# Patient Record
Sex: FEMALE
[De-identification: names, ages, dates, MRNs, and addresses within clinical notes are randomized per-mention and may not be internally consistent; named-entity substitution may affect disease eponyms.]

---

## 2018-03-24 ENCOUNTER — HOSPITAL ENCOUNTER (INPATIENT)
Dept: HOSPITAL 90 - EDH | Age: 43
LOS: 3 days | Discharge: HOME | DRG: 69 | End: 2018-03-27
Attending: FAMILY MEDICINE | Admitting: FAMILY MEDICINE
Payer: SELF-PAY

## 2018-03-24 VITALS — DIASTOLIC BLOOD PRESSURE: 67 MMHG | SYSTOLIC BLOOD PRESSURE: 102 MMHG

## 2018-03-24 VITALS — WEIGHT: 213.2 LBS | BODY MASS INDEX: 34.27 KG/M2 | HEIGHT: 66 IN

## 2018-03-24 VITALS — SYSTOLIC BLOOD PRESSURE: 102 MMHG | DIASTOLIC BLOOD PRESSURE: 74 MMHG

## 2018-03-24 DIAGNOSIS — G45.9: Primary | ICD-10-CM

## 2018-03-24 DIAGNOSIS — E66.9: ICD-10-CM

## 2018-03-24 DIAGNOSIS — E03.9: ICD-10-CM

## 2018-03-24 DIAGNOSIS — E78.5: ICD-10-CM

## 2018-03-24 DIAGNOSIS — Z98.51: ICD-10-CM

## 2018-03-24 DIAGNOSIS — I10: ICD-10-CM

## 2018-03-24 DIAGNOSIS — D32.9: ICD-10-CM

## 2018-03-24 LAB
ALBUMIN SERPL-MCNC: 3.8 G/DL (ref 3.5–5)
ALT SERPL-CCNC: 17 U/L (ref 12–78)
AST SERPL-CCNC: 15 U/L (ref 10–37)
BASOPHILS NFR BLD AUTO: 0.7 % (ref 0–5)
BILIRUB SERPL-MCNC: 0.4 MG/DL (ref 0.2–1)
BUN SERPL-MCNC: 9 MG/DL (ref 7–18)
CHLORIDE SERPL-SCNC: 106 MMOL/L (ref 101–111)
CO2 SERPL-SCNC: 28 MMOL/L (ref 21–32)
CREAT SERPL-MCNC: 0.6 MG/DL (ref 0.5–1.5)
EOSINOPHIL NFR BLD AUTO: 1.4 % (ref 0–8)
ERYTHROCYTE [DISTWIDTH] IN BLOOD BY AUTOMATED COUNT: 14.1 % (ref 11–15.5)
GFR SERPL CREATININE-BSD FRML MDRD: 115 ML/MIN (ref 60–?)
GLUCOSE SERPL-MCNC: 96 MG/DL (ref 70–105)
HCT VFR BLD AUTO: 38.7 % (ref 36–48)
LYMPHOCYTES NFR SPEC AUTO: 32.9 % (ref 21–51)
MCH RBC QN AUTO: 29.3 PG (ref 27–33)
MCHC RBC AUTO-ENTMCNC: 35.9 G/DL (ref 32–36)
MCV RBC AUTO: 81.6 FL (ref 79–99)
MONOCYTES NFR BLD AUTO: 7.6 % (ref 3–13)
NEUTROPHILS NFR BLD AUTO: 57.4 % (ref 40–77)
NRBC BLD MANUAL-RTO: 0 % (ref 0–0.19)
PLATELET # BLD AUTO: 235 K/UL (ref 130–400)
POTASSIUM SERPL-SCNC: 4.1 MMOL/L (ref 3.5–5.1)
PROT SERPL-MCNC: 7.9 G/DL (ref 6–8.3)
RBC # BLD AUTO: 4.74 MIL/UL (ref 4–5.5)
SODIUM SERPL-SCNC: 141 MMOL/L (ref 136–145)
WBC # BLD AUTO: 9.6 K/UL (ref 4.8–10.8)

## 2018-03-24 PROCEDURE — 93306 TTE W/DOPPLER COMPLETE: CPT

## 2018-03-24 PROCEDURE — 93880 EXTRACRANIAL BILAT STUDY: CPT

## 2018-03-24 PROCEDURE — 70553 MRI BRAIN STEM W/O & W/DYE: CPT

## 2018-03-24 PROCEDURE — 83036 HEMOGLOBIN GLYCOSYLATED A1C: CPT

## 2018-03-24 PROCEDURE — 83735 ASSAY OF MAGNESIUM: CPT

## 2018-03-24 PROCEDURE — 85025 COMPLETE CBC W/AUTO DIFF WBC: CPT

## 2018-03-24 PROCEDURE — 36415 COLL VENOUS BLD VENIPUNCTURE: CPT

## 2018-03-24 PROCEDURE — 70450 CT HEAD/BRAIN W/O DYE: CPT

## 2018-03-24 PROCEDURE — 80048 BASIC METABOLIC PNL TOTAL CA: CPT

## 2018-03-24 PROCEDURE — 84443 ASSAY THYROID STIM HORMONE: CPT

## 2018-03-24 PROCEDURE — 80053 COMPREHEN METABOLIC PANEL: CPT

## 2018-03-24 PROCEDURE — 82948 REAGENT STRIP/BLOOD GLUCOSE: CPT

## 2018-03-24 PROCEDURE — 80061 LIPID PANEL: CPT

## 2018-03-24 PROCEDURE — 93005 ELECTROCARDIOGRAM TRACING: CPT

## 2018-03-24 PROCEDURE — 93970 EXTREMITY STUDY: CPT

## 2018-03-25 VITALS — DIASTOLIC BLOOD PRESSURE: 76 MMHG | SYSTOLIC BLOOD PRESSURE: 110 MMHG

## 2018-03-25 VITALS — DIASTOLIC BLOOD PRESSURE: 63 MMHG | SYSTOLIC BLOOD PRESSURE: 106 MMHG

## 2018-03-25 VITALS — DIASTOLIC BLOOD PRESSURE: 63 MMHG | SYSTOLIC BLOOD PRESSURE: 109 MMHG

## 2018-03-25 VITALS — DIASTOLIC BLOOD PRESSURE: 74 MMHG | SYSTOLIC BLOOD PRESSURE: 122 MMHG

## 2018-03-25 VITALS — DIASTOLIC BLOOD PRESSURE: 85 MMHG | SYSTOLIC BLOOD PRESSURE: 105 MMHG

## 2018-03-25 VITALS — SYSTOLIC BLOOD PRESSURE: 100 MMHG | DIASTOLIC BLOOD PRESSURE: 59 MMHG

## 2018-03-25 LAB
BASOPHILS NFR BLD AUTO: 0.4 % (ref 0–5)
BUN SERPL-MCNC: 8 MG/DL (ref 7–18)
CHLORIDE SERPL-SCNC: 108 MMOL/L (ref 101–111)
CHOLEST SERPL-MCNC: 220 MG/DL (ref ?–200)
CO2 SERPL-SCNC: 26 MMOL/L (ref 21–32)
CREAT SERPL-MCNC: 0.6 MG/DL (ref 0.5–1.5)
EOSINOPHIL NFR BLD AUTO: 2 % (ref 0–8)
ERYTHROCYTE [DISTWIDTH] IN BLOOD BY AUTOMATED COUNT: 14.3 % (ref 11–15.5)
GFR SERPL CREATININE-BSD FRML MDRD: 115 ML/MIN (ref 60–?)
GLUCOSE SERPL-MCNC: 102 MG/DL (ref 70–105)
HBA1C MFR BLD: 5.3 % (ref 4–6)
HCT VFR BLD AUTO: 35.7 % (ref 36–48)
HDLC SERPL-MCNC: 38 MG/DL (ref 35–85)
LDLC SERPL CALC-MCNC: 168 MG/DL (ref 0–99)
LYMPHOCYTES NFR SPEC AUTO: 42 % (ref 21–51)
MCH RBC QN AUTO: 28.3 PG (ref 27–33)
MCHC RBC AUTO-ENTMCNC: 34.7 G/DL (ref 32–36)
MCV RBC AUTO: 81.5 FL (ref 79–99)
MONOCYTES NFR BLD AUTO: 7.2 % (ref 3–13)
NEUTROPHILS NFR BLD AUTO: 48.4 % (ref 40–77)
NRBC BLD MANUAL-RTO: 0 % (ref 0–0.19)
PLATELET # BLD AUTO: 212 K/UL (ref 130–400)
POTASSIUM SERPL-SCNC: 3.9 MMOL/L (ref 3.5–5.1)
RBC # BLD AUTO: 4.39 MIL/UL (ref 4–5.5)
SODIUM SERPL-SCNC: 140 MMOL/L (ref 136–145)
TRIGL SERPL-MCNC: 152 MG/DL (ref 30–200)
TSH SERPL DL<=0.05 MIU/L-ACNC: 4.28 UIU/ML (ref 0.36–3.74)
WBC # BLD AUTO: 8.1 K/UL (ref 4.8–10.8)

## 2018-03-25 RX ADMIN — ATORVASTATIN CALCIUM SCH MG: 40 TABLET, FILM COATED ORAL at 09:46

## 2018-03-25 RX ADMIN — LOSARTAN POTASSIUM SCH MG: 50 TABLET, FILM COATED ORAL at 09:46

## 2018-03-25 RX ADMIN — ENOXAPARIN SODIUM SCH MG: 40 INJECTION SUBCUTANEOUS at 09:50

## 2018-03-25 RX ADMIN — DOCUSATE SODIUM SCH MG: 100 TABLET, FILM COATED ORAL at 20:56

## 2018-03-25 RX ADMIN — DOCUSATE SODIUM SCH MG: 100 TABLET, FILM COATED ORAL at 09:50

## 2018-03-25 RX ADMIN — FAMOTIDINE SCH MG: 20 TABLET, FILM COATED ORAL at 02:27

## 2018-03-26 VITALS — SYSTOLIC BLOOD PRESSURE: 102 MMHG | DIASTOLIC BLOOD PRESSURE: 69 MMHG

## 2018-03-26 VITALS — DIASTOLIC BLOOD PRESSURE: 67 MMHG | SYSTOLIC BLOOD PRESSURE: 106 MMHG

## 2018-03-26 VITALS — DIASTOLIC BLOOD PRESSURE: 75 MMHG | SYSTOLIC BLOOD PRESSURE: 103 MMHG

## 2018-03-26 VITALS — SYSTOLIC BLOOD PRESSURE: 107 MMHG | DIASTOLIC BLOOD PRESSURE: 77 MMHG

## 2018-03-26 VITALS — SYSTOLIC BLOOD PRESSURE: 106 MMHG | DIASTOLIC BLOOD PRESSURE: 74 MMHG

## 2018-03-26 LAB
BUN SERPL-MCNC: 14 MG/DL (ref 7–18)
CHLORIDE SERPL-SCNC: 107 MMOL/L (ref 101–111)
CO2 SERPL-SCNC: 27 MMOL/L (ref 21–32)
CREAT SERPL-MCNC: 0.7 MG/DL (ref 0.5–1.5)
GFR SERPL CREATININE-BSD FRML MDRD: 97 ML/MIN (ref 60–?)
GLUCOSE SERPL-MCNC: 97 MG/DL (ref 70–105)
MAGNESIUM SERPL-MCNC: 1.9 MG/DL (ref 1.8–2.4)
POTASSIUM SERPL-SCNC: 4 MMOL/L (ref 3.5–5.1)
SODIUM SERPL-SCNC: 140 MMOL/L (ref 136–145)

## 2018-03-26 RX ADMIN — LOSARTAN POTASSIUM SCH MG: 50 TABLET, FILM COATED ORAL at 09:15

## 2018-03-26 RX ADMIN — FAMOTIDINE SCH MG: 20 TABLET, FILM COATED ORAL at 02:10

## 2018-03-26 RX ADMIN — DOCUSATE SODIUM SCH MG: 100 TABLET, FILM COATED ORAL at 20:46

## 2018-03-26 RX ADMIN — ASPIRIN SCH MG: 81 TABLET, CHEWABLE ORAL at 09:14

## 2018-03-26 RX ADMIN — FAMOTIDINE SCH MG: 20 TABLET, FILM COATED ORAL at 23:50

## 2018-03-26 RX ADMIN — DOCUSATE SODIUM SCH MG: 100 TABLET, FILM COATED ORAL at 09:14

## 2018-03-26 RX ADMIN — ATORVASTATIN CALCIUM SCH MG: 40 TABLET, FILM COATED ORAL at 09:14

## 2018-03-26 RX ADMIN — ENOXAPARIN SODIUM SCH MG: 40 INJECTION SUBCUTANEOUS at 09:15

## 2018-03-27 VITALS — SYSTOLIC BLOOD PRESSURE: 102 MMHG | DIASTOLIC BLOOD PRESSURE: 64 MMHG

## 2018-03-27 VITALS — DIASTOLIC BLOOD PRESSURE: 74 MMHG | SYSTOLIC BLOOD PRESSURE: 104 MMHG

## 2018-03-27 VITALS — SYSTOLIC BLOOD PRESSURE: 125 MMHG | DIASTOLIC BLOOD PRESSURE: 62 MMHG

## 2018-03-27 RX ADMIN — LOSARTAN POTASSIUM SCH MG: 50 TABLET, FILM COATED ORAL at 08:34

## 2018-03-27 RX ADMIN — ENOXAPARIN SODIUM SCH MG: 40 INJECTION SUBCUTANEOUS at 08:35

## 2018-03-27 RX ADMIN — DOCUSATE SODIUM SCH MG: 100 TABLET, FILM COATED ORAL at 08:34

## 2018-03-27 RX ADMIN — ATORVASTATIN CALCIUM SCH MG: 40 TABLET, FILM COATED ORAL at 08:34

## 2018-03-27 RX ADMIN — ASPIRIN SCH MG: 81 TABLET, CHEWABLE ORAL at 08:34

## 2018-12-23 ENCOUNTER — HOSPITAL ENCOUNTER (EMERGENCY)
Dept: HOSPITAL 90 - EDH | Age: 43
Discharge: ANOTHER HOSPITAL | End: 2018-12-23
Payer: SELF-PAY

## 2018-12-23 DIAGNOSIS — E78.00: ICD-10-CM
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
## 2018-12-23 DIAGNOSIS — S10.15XA: Primary | ICD-10-CM

## 2018-12-23 DIAGNOSIS — I10: ICD-10-CM
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
## 2018-12-23 DIAGNOSIS — Z85.841: ICD-10-CM

## 2018-12-23 PROCEDURE — 70490 CT SOFT TISSUE NECK W/O DYE: CPT

## 2019-07-21 ENCOUNTER — HOSPITAL ENCOUNTER (EMERGENCY)
Dept: HOSPITAL 90 - EDH | Age: 44
Discharge: HOME | End: 2019-07-21
Payer: COMMERCIAL
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
## 2019-07-21 DIAGNOSIS — S13.9XXA: Primary | ICD-10-CM

## 2019-07-21 DIAGNOSIS — I10: ICD-10-CM
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
## 2019-07-21 DIAGNOSIS — E78.00: ICD-10-CM
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
## 2019-07-21 PROCEDURE — 72040 X-RAY EXAM NECK SPINE 2-3 VW: CPT

## 2024-12-13 ENCOUNTER — HOSPITAL ENCOUNTER (EMERGENCY)
Dept: HOSPITAL 90 - EDH | Age: 49
LOS: 1 days | Discharge: HOME | End: 2024-12-14
Payer: COMMERCIAL

## 2024-12-13 VITALS — BODY MASS INDEX: 29.16 KG/M2 | WEIGHT: 175.05 LBS | HEIGHT: 65 IN

## 2024-12-13 DIAGNOSIS — E78.00: ICD-10-CM

## 2024-12-13 DIAGNOSIS — Z79.82: ICD-10-CM

## 2024-12-13 DIAGNOSIS — Z98.890: ICD-10-CM

## 2024-12-13 DIAGNOSIS — R51.9: Primary | ICD-10-CM

## 2024-12-13 DIAGNOSIS — Z86.73: ICD-10-CM

## 2024-12-13 DIAGNOSIS — Z79.899: ICD-10-CM

## 2024-12-13 DIAGNOSIS — Z90.710: ICD-10-CM

## 2024-12-13 LAB
BASOPHILS # BLD AUTO: 0.06 K/UL (ref 0–0.2)
BASOPHILS NFR BLD AUTO: 0.8 % (ref 0–5)
BUN SERPL-MCNC: 15 MG/DL (ref 7–18)
CHLORIDE SERPL-SCNC: 107 MMOL/L (ref 101–111)
CO2 SERPL-SCNC: 30 MMOL/L (ref 21–32)
CREAT SERPL-MCNC: 0.8 MG/DL (ref 0.5–1)
EOSINOPHIL # BLD AUTO: 0.09 K/UL (ref 0–0.7)
EOSINOPHIL NFR BLD AUTO: 1.1 % (ref 0–8)
ERYTHROCYTE [DISTWIDTH] IN BLOOD BY AUTOMATED COUNT: 13.2 % (ref 11–15.5)
GFR SERPL CREATININE-BSD FRML MDRD: 89 ML/MIN (ref 90–?)
GLUCOSE SERPL-MCNC: 117 MG/DL (ref 70–105)
HCT VFR BLD AUTO: 35.6 % (ref 36–48)
IMM GRANULOCYTES # BLD: 0.02 K/UL (ref 0–1)
LYMPHOCYTES # SPEC AUTO: 2.5 K/UL (ref 1–4.8)
LYMPHOCYTES NFR SPEC AUTO: 32 % (ref 21–51)
MCH RBC QN AUTO: 28.4 PG (ref 27–33)
MCHC RBC AUTO-ENTMCNC: 32.9 G/DL (ref 32–36)
MCV RBC AUTO: 86.4 FL (ref 79–99)
MONOCYTES # BLD AUTO: 0.5 K/UL (ref 0.1–1)
MONOCYTES NFR BLD AUTO: 6 % (ref 3–13)
NEUTROPHILS # BLD AUTO: 4.7 K/UL (ref 1.8–7.7)
NEUTROPHILS NFR BLD AUTO: 59.8 % (ref 40–77)
NRBC BLD MANUAL-RTO: 0 % (ref 0–0.19)
PLATELET # BLD AUTO: 222 K/UL (ref 130–400)
POTASSIUM SERPL-SCNC: 3.8 MMOL/L (ref 3.5–5.1)
RBC # BLD AUTO: 4.12 MIL/UL (ref 4–5.5)
SODIUM SERPL-SCNC: 142 MMOL/L (ref 136–145)
WBC # BLD AUTO: 7.9 K/UL (ref 4.8–10.8)

## 2024-12-13 PROCEDURE — 99284 EMERGENCY DEPT VISIT MOD MDM: CPT

## 2024-12-13 PROCEDURE — 70450 CT HEAD/BRAIN W/O DYE: CPT

## 2024-12-13 PROCEDURE — 36415 COLL VENOUS BLD VENIPUNCTURE: CPT

## 2024-12-13 PROCEDURE — 80048 BASIC METABOLIC PNL TOTAL CA: CPT

## 2024-12-13 PROCEDURE — 85025 COMPLETE CBC W/AUTO DIFF WBC: CPT

## 2024-12-14 VITALS
SYSTOLIC BLOOD PRESSURE: 150 MMHG | TEMPERATURE: 98.3 F | OXYGEN SATURATION: 98 % | DIASTOLIC BLOOD PRESSURE: 77 MMHG | RESPIRATION RATE: 16 BRPM | HEART RATE: 67 BPM

## 2024-12-14 NOTE — [facility]
CT HEAD/BRAIN W/O CONTRAST



HISTORY: Severe headaches



COMPARISON: None



TECHNIQUE: Multiple sequential axial images of the head were obtained

from the base of the skull through vertex. Patient was not given

contrast through intravenous route. 



FINDINGS: The ventricles and extraventricular CSF spaces are nondilated

for patient's age.  There is no midline shift, mass effect or

herniation.  No acute intracranial bleed is seen.  Visualized portion of

the paranasal sinuses are grossly within normal limits.



IMPRESSION: 

1. No acute intracranial bleed is seen.









CT was performed with one or more following dose reduction techniques:

automated exposure control, adjustment of the mA and kv according to

patient's size, or use of a iterative reconstruction technique.

## 2024-12-14 NOTE — ERN
General


Chief Complaint:  Headache


Stated Complaint:  HEADACHE


Time Seen by MD:  19:30


Time Seen by Midlevel:  19:30


Source:  patient





History of Present Illness


Initial Comments


Patient is a 49-year-old female with a past medical history of an ischemic 

stroke and meningioma presenting to the emergency department with intermittent 

episodes of a headache.  The headache starts in the parietal area but then 

radiates to the frontal aspect of the scalp.  During these episodes of headaches

she reports vision changes.  They usually resolve on their own however they have

been come more frequently recently.  Given patient's history of stroke and 

meningioma she was concerned so she decided to report to the ER for further 

evaluation.


Allergies:  


Coded Allergies:  


     No Known Drug Allergies (Unverified  Allergy, Unknown, 3/24/18)


Home Meds


Active Scripts


Ketorolac Tromethamine (Ketorolac Tromethamine) 10 Mg Tablet, 1 TAB PO TID for 

pain for 5 Days, #15 TAB 0 Refills


   Prov:MICHELLE GLEZ         24


Ondansetron HCl (Ondansetron HCl) 4 Mg Tablet, 4 MG PO TIDP PRN for VOMITING, 

#20 TAB


   Prov:FLORY LARSON MD         22


Omeprazole (Omeprazole) 40 Mg Capsule.dr, 40 MG PO DAILY, #30 CAP


   Prov:FLORY LARSON MD         22


Losartan Potassium (Cozaar) 50 Mg Tablet, 25 MG PO DAILY for 30 Days, #30 TAB


   Prov:RAFITA FRAGOSO NP         3/26/18


Atorvastatin Calcium (LIPITOR) 40 Mg Tablet, 40 MG PO DAILY for 30 Days, #30 TAB


   Prov:RAFITA FRAGOSO NP         3/26/18


Aspirin (ASPIRIN 81MG CHEW TAB) 81 Mg Tab.chew, 81 MG PO DAILY for 30 Days, #30 

TAB.CHEW


   Prov:RAFITA FRAGOSO NP         3/26/18


Reported Medications


Losartan Potassium (Losartan Potassium) 25 Mg Tablet, 25 MG PO AM, TAB


   3/25/18





Past Medical History


Past Medical History:  Anemia, High Cholesterol, Other


Medical History Other:  HERNIA


Past Surgical History:  Hysterectomy, Other


Surgical History Other:  TUBAL LIGATION





Family History


Family History:  HTN





Social History


Social History:  Negative





ROS Dictation


CONSTITUTIONAL:  Negative except for HPI


HEAD/FACE:  Negative except for HPI


EENT:  Negative except for HPI


RESPIRATORY: Negative except for HPI


GASTROINTESTINAL/ABDOMINAL:   Negative except for HPI


GENITOURINARY: Negative except for HPI


MUSCULOSKELETAL: Negative except for HPI


INTEGUMENTARY:  Negative except for HPI


NEUROLOGICAL/PSYCH: Negative except for HPI


HEMATOLOGIC/LYMPHATIC:  Negative except for HPI





All Systems Negative, Except as noted above.





13 point review of systems assessed and all negative except for above.





Physical Exam


Physical Exam Dictation


Vital Signs reviewed 


General Appearance: Alert, oriented x 3, no acute distress, well developed, 

nourished. 


Head and Face: non-traumatic.


Eyes: PERRL, pink conjunctivas, eyelid no trauma, anterior chamber with arcus 

senilis. 


Ears: Pinnas intact and no signs of trauma or erythema ear canals clear and no 

discharge TM no erythema 


Nose: No discharge, no bleeding. 


Oropharynx: Mouth normal, tongue pink, 


pharynx clear,no erythema, tonsils no exudates, no abscesses noted,  mucous 

membrane moist 


Neck: Supple, non-tender, no thyromegaly, no masses, no JVD, no bruits 


Breast:Deferred 


Chest:No tenderness, no crepitus, no paradoxical movement, no retractions 


Lungs:Clear, well-ventilated, symmetric, no rales, no wheezing, no rhonchi, no 

stridor, good breath sounds bilaterally 


Heart: Regular rate, regular rhythm, no murmur, no gallops 


Vascular: no peripheral edema, 


Abdomen: Soft, positive bowel sounds, nondistended, no guarding, 


nontender, no rebound, no masses no hepatomegaly, no splenomegaly, no Juárez's 

sign, no hernias.


Rectal: Deferred


Genital: Deferred


Neurological: Normal speech,  motor function intact, sensory function intact 


Musculoskeletal: Neck nontender, full range of motion, back nontender, full 

range of motion,


Extremities: nontender, full range of motion 


Skin: Color pink, dry, no turgor, no rash, no lacerations, no abrasions, no 

contusions.


Lymphatic: Deferred





Results


Laboratory and Microbiology


Lab and Micro Result





Laboratory Tests








Test


 24


20:25


 


White Blood Count


 7.9 K/uL


(4.8-10.8)


 


Red Blood Count


 4.12 MIL/uL


(4.00-5.50)


 


Hemoglobin


 11.7 g/dL


(12.0-16.0)  L


 


Hematocrit


 35.6 % (36-48)


L


 


Mean Corpuscular Volume


 86.4 fL


(79-99)


 


Mean Corpuscular Hemoglobin


 28.4 pg


(27.0-33.0)


 


Mean Corpuscular Hemoglobin


Concent 32.9 g/dL


(32.0-36.0)


 


Red Cell Distribution Width


 13.2 %


(11.0-15.5)


 


Platelet Count


 222 K/uL


(130-400)


 


Mean Platelet Volume


 11.5 fL


(7.5-10.5)  H


 


Immature Granulocyte % (Auto) 0.3 % (0-1)  


 


Neutrophils (%) (Auto)


 59.8 %


(40.0-77.0)


 


Lymphocytes (%) (Auto)


 32.0 %


(21.0-51.0)


 


Monocytes (%) (Auto)


 6.0 %


(3.0-13.0)


 


Eosinophils (%) (Auto)


 1.1 %


(0.0-8.0)


 


Basophils (%) (Auto)


 0.8 %


(0.0-5.0)


 


Neutrophils # (Auto)


 4.7 K/uL


(1.8-7.7)


 


Lymphocytes # (Auto)


 2.5 K/uL


(1.0-4.8)


 


Monocytes # (Auto)


 0.5 K/uL


(0.1-1.0)


 


Eosinophils # (Auto)


 0.09 K/uL


(0.00-0.70)


 


Basophils # (Auto)


 0.06 K/uL


(0.00-0.20)


 


Absolute Immature Granulocyte


(auto 0.02 K/uL


(0-1)


 


Nucleated Red Blood Cells


 0.0 %


(0.0-0.19)


 


Sodium Level


 142 mmol/L


(136-145)


 


Potassium Level


 3.8 mmol/L


(3.5-5.1)


 


Chloride Level


 107 mmol/L


(101-111)


 


Carbon Dioxide Level


 30 mmol/L


(21-32)


 


Blood Urea Nitrogen


 15 mg/dL


(7-18)


 


Creatinine


 0.8 mg/dL


(0.5-1.0)


 


Glomerular Filtration Rate


Calc 89 mL/min


(>90)


 


Random Glucose


 117 mg/dL


()  H


 


Total Calcium


 8.8 mg/dL


(8.5-10.1)








Labs Reviewed?:  Yes





MDM


MDM: Patient is a 49-year-old female with a past medical history of an ischemic 

stroke and meningioma presenting to the emergency department with intermittent 

episodes of a headache.  The headache starts in the parietal area but then 

radiates to the frontal aspect of the scalp.  During these episodes of headaches

she reports vision changes.  They usually resolve on their own however they have

been come more frequently recently.  Given patient's history of stroke and 

meningioma she was concerned so she decided to report to the ER for further 

evaluation.  On physical exam on physical examination patient is in no acute 

distress.  Her vital signs are stable.  Her neurological is unremarkable.  She 

has a GCS of 15.  She was reporting a severe headache at this time.  Basic labs 

were obtained and they are unremarkable however given her medical history CT 

scan of the head was obtained which does not show any acute abnormalities.  

Patient was observed in the ER for over 4 hours and has remained stable.  It did

offer admission for further workup however patient is refusing to be admitted.  

Patient states she will be following up outpatient and will be returning to the 

ER if he develops any new or worsening symptoms.


Differential diagnosis:  Migraine headaches, intracranial bleed, meningioma, 

brain tumor





There are no social concerns with this patient.





Prescription drug management


Prescriptions will include:  Toradol





Medical management and examination interpretation discussions were had by me 

with other qualified healthcare professionals as indicated for the patient's 

care.


ED Course





Orders








Procedure Category Date Status





  Time 


 


Cbc With Differential LAB 24 Complete





  19:37 


 


Basic Metabolic Panel LAB 24 Complete





  19:37 


 


Hydrocodone/Apap PHA 24 Complete





10/325 Tab (Norco 10)  20:00 


 


Ct Head/Brain W/O CT 24 Resulted





Contrast  22:30 








Current Medications








 Medications


  (Trade)  Dose


 Ordered  Sig/Ildefonso


 Route


 PRN Reason  Start Time


 Stop Time Status Last Admin


Dose Admin


 


 Acetaminophen/


 Hydrocodone Bitart


  (NORco 10)  1 tab  ONCE  ONCE


 PO


   24 20:00


 24 20:01 DC 24 22:38











Vital Signs








  Date Time  Temp Pulse Resp B/P (MAP) Pulse Ox O2 Delivery O2 Flow Rate FiO2


 


24 19:30 98.2 77 16 165/85 98 Room Air  





[facility]


                    [address] [place] [address]


                                 [phone]


                                        


                                 IMAGING REPORT


                                     Signed





PATIENT: CRISTINA MERA                                MR#: [id number]


ACCOUNT#: [id number]                              : 1975


SEX: F AGE: 49                                      LOCATION: [facility]


ORDER DT: 24                             STATUS: REG ER


ACCESSION#: [id number]                            REPORT#: [id number]


SERVICE DT: 24





REASON: severe headaches hx of infarct/meningioma


ORDERING PHYSICIAN: MICHELLE GLEZ


PROCEDURE: HEAD WO  -  CT HEAD/BRAIN W/O CONTRAST





CT HEAD/BRAIN W/O CONTRAST





HISTORY: Severe headaches





COMPARISON: None





TECHNIQUE: Multiple sequential axial images of the head were obtained


from the base of the skull through vertex. Patient was not given


contrast through intravenous route. 





FINDINGS: The ventricles and extraventricular CSF spaces are nondilated


for patient's age.  There is no midline shift, mass effect or


herniation.  No acute intracranial bleed is seen.  Visualized portion of


the paranasal sinuses are grossly within normal limits.





IMPRESSION: 


1. No acute intracranial bleed is seen.














CT was performed with one or more following dose reduction techniques:


automated exposure control, adjustment of the mA and kv according to


patient's size, or use of a iterative reconstruction technique.








DICTATED BY: ELROY RIDLEY MD


DATE: 24 0001





ELECTRONICALLY SIGNED BY: ELROY RIDLEY MD


DATE: 24 0005








DX & DISP


Disposition:  Discharge


Departure


Impression:  


   Primary Impression:  Recurrent headache


   Additional Impressions:  History of stroke, History of meningioma of the 

   brain


Condition:  Stable


Scripts


Ketorolac Tromethamine (Ketorolac Tromethamine) 10 Mg Tablet


1 TAB PO TID for pain for 5 Days, #15 TAB 0 Refills


   Prov: MICHELLE GLEZ         24





Additional Instructions:  


Your blood work today is unremarkable.  


Your CT scan is negative for any acute abnormality.  


You will need to follow up with your primary care provider for repeat 

evaluation.  


Return to the ER for any new or worsening symptoms


Referrals:  


REYES,RAUL MD (PCP)








RON REA MD, DARIO E MD


Time of Disposition:  00:10


I have reviewed the case, and I agree with, Diagnosis and Plan


I performed the substantive portion of the visit.  I have reviewed and p

ersonally made and approve the management plan that is documented in the note by

myself or the AIDE. I acknowledge for responsibility for the patient's management

plan.











MICHELLE GLEZ                Dec 14, 2024 00:11

## 2025-03-22 ENCOUNTER — HOSPITAL ENCOUNTER (EMERGENCY)
Dept: HOSPITAL 90 - EDH | Age: 50
Discharge: HOME | End: 2025-03-22
Payer: COMMERCIAL

## 2025-03-22 VITALS — HEIGHT: 62 IN | WEIGHT: 210.1 LBS | BODY MASS INDEX: 38.66 KG/M2

## 2025-03-22 VITALS
TEMPERATURE: 98 F | RESPIRATION RATE: 18 BRPM | SYSTOLIC BLOOD PRESSURE: 127 MMHG | HEART RATE: 76 BPM | OXYGEN SATURATION: 97 % | DIASTOLIC BLOOD PRESSURE: 67 MMHG

## 2025-03-22 DIAGNOSIS — Z79.82: ICD-10-CM

## 2025-03-22 DIAGNOSIS — Z90.710: ICD-10-CM

## 2025-03-22 DIAGNOSIS — E78.00: ICD-10-CM

## 2025-03-22 DIAGNOSIS — Z79.899: ICD-10-CM

## 2025-03-22 DIAGNOSIS — J10.1: Primary | ICD-10-CM

## 2025-03-22 DIAGNOSIS — Z20.822: ICD-10-CM

## 2025-03-22 LAB — SARS-COV-2 RNA SPEC QL NAA+PROBE: (no result)

## 2025-03-22 PROCEDURE — 99284 EMERGENCY DEPT VISIT MOD MDM: CPT

## 2025-03-22 PROCEDURE — 96372 THER/PROPH/DIAG INJ SC/IM: CPT

## 2025-03-22 PROCEDURE — 87880 STREP A ASSAY W/OPTIC: CPT

## 2025-03-22 PROCEDURE — 87635 SARS-COV-2 COVID-19 AMP PRB: CPT

## 2025-03-22 PROCEDURE — 87804 INFLUENZA ASSAY W/OPTIC: CPT

## 2025-03-22 NOTE — ERN
ED Note


History of Present Illness


Stated Complaint:  FEVER, EAR PAIN, SORE THROAT


Chief Complaint:  Flu Symptoms


Time Seen by MD:  21:46


Time Seen by Midlevel:  21:46


Dictation:


The patient is a 49-year-old female with a history of abdominal hernia who pr

esents to the emergency department with complaints of fever, sore throat, 

headache, generalized body weakness, ear pain onset Wednesday 3/19.  Patient 

denies any cough denies any nausea or vomiting.


Allergies:  


Coded Allergies:  


     No Known Drug Allergies (Unverified  Allergy, Unknown, 3/24/18)


Home Meds


Active Scripts


Ketorolac Tromethamine (Ketorolac Tromethamine) 10 Mg Tablet, 1 TAB PO TID for 

pain for 5 Days, #15 TAB 0 Refills


   Prov:MICHELLE GLEZ         12/14/24


Ondansetron HCl (Ondansetron HCl) 4 Mg Tablet, 4 MG PO TIDP PRN for VOMITING, 

#20 TAB


   Prov:FLORY LARSON MD         11/30/22


Omeprazole (Omeprazole) 40 Mg Capsule.dr, 40 MG PO DAILY, #30 CAP


   Prov:FLORY LARSON MD         11/30/22


Losartan Potassium (Cozaar) 50 Mg Tablet, 25 MG PO DAILY for 30 Days, #30 TAB


   Prov:RAFITA FRAGOSO NP         3/26/18


Atorvastatin Calcium (LIPITOR) 40 Mg Tablet, 40 MG PO DAILY for 30 Days, #30 TAB


   Prov:RAFITA FRAGOSO NP         3/26/18


Aspirin (ASPIRIN 81MG CHEW TAB) 81 Mg Tab.chew, 81 MG PO DAILY for 30 Days, #30 

TAB.CHEW


   Prov:RAFITA FRAGOSO NP         3/26/18


Reported Medications


Losartan Potassium (Losartan Potassium) 25 Mg Tablet, 25 MG PO AM, TAB


   3/25/18





Past Medical History


Past Medical History:  Anemia, High Cholesterol, Other


Additional Past Medical Hx:  HERNIA


Surgical History:  Hysterectomy, Other


Surgical History Other:  TUBAL LIGATION


Family History:  HTN


Social History:  Negative


RN Note Reviewed/Agreed w/PFSH:  Yes





Review of System


Dictation


Constitutional: Negative for chills, and weight loss positive for fevers


Eyes: Negative for injury, pain,redness, and discharge


ENT: Negative for injury,pain or swelling positive for sore throat, ear pain


Cardiovascular: Negative for chest pain, palpitations, and edema


Respiratory: Negative for shortness of breath, cough, and wheezing, 


Abdomen/GI: Negative for abdominal pain, nausea, vomiting, diarrhea, and 

constipation


Back: Negative for injury and pain


: Negative for injury, bleeding and discharge


MS/Extremity: Negative for injury and deformity


Skin: Negative for rash, and discoloration


Neuro: Negative for headache, weakness, numbness, tingling, and seizure 


Psych: Negative for suicide ideation, homicidal ideation, and hallucinations





Initial Vital Sign


VS





                                   Vital Signs








  Date Time  Temp Pulse Resp B/P (MAP) Pulse Ox O2 Delivery O2 Flow Rate FiO2


 


3/22/25 21:43 100.2 103 20 146/98 98 Room Air  


 


3/22/25 21:53       0 21











Physical Exam


Dictation


Vital Signs reviewed 


General Appearance: Alert, oriented x 3, no acute distress, well developed, 

nourished. 


Head and Face: non-traumatic.


Eyes: PERRL, pink conjunctivas, eyelid no trauma, anterior chamber with arcus 

senilis. 


Ears: Pinnas intact and no signs of trauma , +erythema ear canals clear and no 

discharge TM no erythema 


Nose: No discharge, no bleeding. 


Oropharynx: Mouth normal, tongue pink.


pharynx clear,no erythema, tonsils no exudates, no abscesses noted,  mucous 

membrane moist 


Neck: Supple, non-tender, no thyromegaly, no masses, no JVD, no bruits 


Breast:Deferred 


Chest:No tenderness, no crepitus, no paradoxical movement, no retractions 


Lungs:Clear, well-ventilated, symmetric, no rales, no wheezing, no rhonchi, no 

stridor, good breath sounds bilaterally 


Heart: Regular rate, regular rhythm, no murmur, no gallops 


Vascular: no peripheral edema, 


Abdomen: Soft, positive bowel sounds, nondistended, no guarding, 


nontender, no rebound, no masses no hepatomegaly, no splenomegaly, no Juárez's 

sign, no hernias.


Rectal: Deferred


Genital: Deferred


Neurological: Normal speech,  motor function intact, sensory function intact 


Musculoskeletal: Neck nontender, full range of motion, back nontender, full 

range of motion,


Extremities: nontender, full range of motion 


Skin: Color pink, dry, no turgor, no rash, no lacerations, no abrasions, no 

contusions.


Lymphatic: Deferred





Results (Laboratory/Radiology)


Laboratory/Radiology





Laboratory Tests








Test


 3/22/25


21:45


 


Influenza Type A Antigen


 Negative For


Type A


 


Influenza Type B Antigen


 Positive For


Type B


 


SARS-CoV-2, RNA, NAAT


 NEGATIVE SARS


CoV-2


 


Group A Streptococcus Rapid


 negative


(NEGATIVE)








Labs Reviewed?:  Yes





ED Course


ED Course





Orders








Procedure Category Date Status





  Time 


 


Covid Rna Naat LAB 3/22/25 Complete





  21:43 


 


Influenza Type A & B, LAB 3/22/25 Complete





Rapid  21:43 


 


Rapid (Group A Strep) LAB 3/22/25 Complete





  21:43 


 


Ketorolac 60mg/2ml PHA 3/22/25 Complete





 (Toradol 60mg/2ml)  22:00 


 


Acetaminophen 500mg PHA 3/22/25 Complete





Tab (Tylenol 500mg T  22:00 








Current Medications








 Medications


  (Trade)  Dose


 Ordered  Sig/Ildefonso


 Route


 PRN Reason  Start Time


 Stop Time Status Last Admin


Dose Admin


 


 Acetaminophen


  (TYLenol 500MG


 TAB)  1,000 mg  ONCE  ONCE


 PO


   3/22/25 22:00


 3/22/25 22:01 DC 3/22/25 22:10





 


 Ketorolac


 Tromethamine


  (toRADol 60MG/


 2ML)  60 mg  ONCE  ONCE


 IM


   3/22/25 22:00


 3/22/25 22:01 DC 3/22/25 22:10











Vital Signs








  Date Time  Temp Pulse Resp B/P (MAP) Pulse Ox O2 Delivery O2 Flow Rate FiO2


 


3/22/25 21:53 98.4 78 20 132/65 98 Room Air* 0 21


 


3/22/25 21:43 100.2 103 20 146/98 98 Room Air  











Medical Decision Making


MDM


The patient is a 49-year-old female with a history of abdominal hernia who 

presents to the emergency department with complaints of fever, sore throat, 

headache, generalized body weakness, ear pain onset Wednesday 3/19.  Patient 

denies any cough denies any nausea or vomiting.


Serology positive for influenza B. patient's symptoms greater than 48 hours.  We

will not benefit from Tamiflu.  Patient also with no comorbidities.  Patient in 

no acute distress, clear lung sounds.  Will be discharged to follow up with PCP.


Differential diagnosis:  COVID 19 infection, influenza, otitis media, 

pharyngitis, strep throat





Need for hospitalization: Patient does not meet criteria for hospitalization.





There are no social concerns with this patient.





DX & DISP


Disposition:  Discharge


Departure


Impression:  


   Primary Impression:  Influenza B


Condition:  Stable





Additional Instructions:  


Please continue to take Tylenol and Motrin as needed for fevers.  Please follow 

up with the primary doctor.  Continue oral hydration at home if symptoms worsen 

please return to ER.


FOLLOW-UP WITH PRIMARY CARE PROVIDER IN 1 TO 2 DAYS.  TAKE MEDICATIONS AS 

DIRECTED HERE IN THE EMERGENCY ROOM.  OKAY TO CONTINUE HOME MEDICATIONS UNLESS 

OTHERWISE DISCUSSED DURING YOUR VISIT IN THE EMERGENCY ROOM TODAY.  RETURN TO 

YOUR NEAREST EMERGENCY ROOM IF SYMPTOMS WORSEN OR IF THERE IS NO IMPROVEMENT.  

CALL 911 IF YOU NEED IMMEDIATE ASSISTANCE.  TAKE TYLENOL OR MOTRIN 

OVER-THE-COUNTER AS NEEDED AND IF NO CONTRAINDICATIONS ARE PRESENT.  INCREASE 

ORAL HYDRATION.  A WOUND CULTURE OR URINE CULTURE WAS ORDERED HERE IN THE 

EMERGENCY ROOM DEPARTMENT PLEASE FOLLOW-UP WITH PRIMARY CARE PROVIDER AND ADVISE

THEM TO GET REPEAT PORTS FROM OUR FACILITY.  IF YOU HAD ANY ACE WRAP/SPLINTS TH

AT WERE APPLIED HERE, PLEASE DO NOT REMOVE THEM UNTIL YOU SEE YOUR PRIMARY CARE 

OR SPECIALTY.


Referrals:  


HANSEL GROVES (PCP)


Time of Disposition:  23:03


I have reviewed the case, and I agree with, Diagnosis and Plan











JANIS SCHWARZ               Mar 22, 2025 23:03

## 2025-04-21 ENCOUNTER — HOSPITAL ENCOUNTER (EMERGENCY)
Dept: HOSPITAL 90 - EDH | Age: 50
LOS: 1 days | Discharge: HOME | End: 2025-04-22
Payer: COMMERCIAL

## 2025-04-21 VITALS — HEIGHT: 64 IN | WEIGHT: 229.94 LBS | BODY MASS INDEX: 39.26 KG/M2

## 2025-04-21 DIAGNOSIS — Z79.899: ICD-10-CM

## 2025-04-21 DIAGNOSIS — Z98.51: ICD-10-CM

## 2025-04-21 DIAGNOSIS — Z79.82: ICD-10-CM

## 2025-04-21 DIAGNOSIS — I10: ICD-10-CM

## 2025-04-21 DIAGNOSIS — E86.0: Primary | ICD-10-CM

## 2025-04-21 LAB
BASOPHILS # BLD AUTO: 0.04 K/UL (ref 0–0.2)
BASOPHILS NFR BLD AUTO: 0.6 % (ref 0–5)
CREAT SERPL-MCNC: 0.5 MG/DL (ref 0.5–1)
EOSINOPHIL # BLD AUTO: 0.15 K/UL (ref 0–0.7)
EOSINOPHIL NFR BLD AUTO: 2.2 % (ref 0–8)
ERYTHROCYTE [DISTWIDTH] IN BLOOD BY AUTOMATED COUNT: 13.8 % (ref 11–15.5)
HCT VFR BLD AUTO: 35.7 % (ref 36–48)
IMM GRANULOCYTES # BLD: 0.01 K/UL (ref 0–1)
LYMPHOCYTES # SPEC AUTO: 2.8 K/UL (ref 1–4.8)
LYMPHOCYTES NFR SPEC AUTO: 41.6 % (ref 21–51)
MCHC RBC AUTO-ENTMCNC: 32.8 G/DL (ref 32–36)
MCV RBC AUTO: 85.4 FL (ref 79–99)
MONOCYTES # BLD AUTO: 0.5 K/UL (ref 0.1–1)
MONOCYTES NFR BLD AUTO: 7.8 % (ref 3–13)
NEUTROPHILS # BLD AUTO: 3.2 K/UL (ref 1.8–7.7)
NEUTROPHILS NFR BLD AUTO: 47.7 % (ref 40–77)
PLATELET # BLD AUTO: 222 K/UL (ref 130–400)
POTASSIUM SERPL-SCNC: 3.9 MMOL/L (ref 3.5–5.1)
RBC # BLD AUTO: 4.18 MIL/UL (ref 4–5.5)
WBC # BLD AUTO: 6.7 K/UL (ref 4.8–10.8)

## 2025-04-21 PROCEDURE — 80048 BASIC METABOLIC PNL TOTAL CA: CPT

## 2025-04-21 PROCEDURE — 99283 EMERGENCY DEPT VISIT LOW MDM: CPT

## 2025-04-21 PROCEDURE — 99284 EMERGENCY DEPT VISIT MOD MDM: CPT

## 2025-04-21 PROCEDURE — 36415 COLL VENOUS BLD VENIPUNCTURE: CPT

## 2025-04-21 PROCEDURE — 85025 COMPLETE CBC W/AUTO DIFF WBC: CPT

## 2025-04-21 RX ADMIN — Medication STA ML: at 22:38

## 2025-04-22 VITALS
DIASTOLIC BLOOD PRESSURE: 72 MMHG | HEART RATE: 80 BPM | RESPIRATION RATE: 18 BRPM | OXYGEN SATURATION: 96 % | SYSTOLIC BLOOD PRESSURE: 134 MMHG | TEMPERATURE: 98.1 F